# Patient Record
(demographics unavailable — no encounter records)

---

## 2024-11-15 NOTE — ASSESSMENT
[FreeTextEntry1] :  # Hemangioma on L neck, R anterior thigh   - Discussed nature and course of chronic condition.  - Discussed the risks and benefits of different treatment options, including topical timolol and oral propranolol  - C/w topical timolol 0.5% ophthalmic GFS - 1 drop BID to hemangioma--sent 3 mo supply with 1 refill  - Counseled dad to keep the medication in a safe place in order to avoid accidental ingestion of the medication.   #Xerosis, chronic, flare Dry skin care reviewed:  - Take short showers/baths (avoid hot water)  - Use a mild soap (eg. CeraVe cleanser or Aquaphor)  - Use soap only on areas truly needed (underarms,groin,buttocks,fold areas, feet, face, hair)  - Pat off excess water and put moisturizer on immediately (within 3 min.)  Good moisturizing choices include:  1. Cetaphil cream (not baby Cetaphil)  2. CeraVe cream  3. Vanicream cream  4. Aquaphor ointment  5. Vaseline ointment  6. CeraVe ointment  - A moisturizer should always be applied after showering or bathing, but may be applied as many additional times as is necessary.  # Lichen striatus Education, reassurance  RTC PRN

## 2024-11-15 NOTE — PHYSICAL EXAM
[FreeTextEntry3] : light red soft vascular plaque on R anterior thigh--lighter compared to LV light red vascular plaque L neck--lighter/duller compared to LV hypopigmented macules on R abdomen in blaschkoidal distribution

## 2024-11-15 NOTE — CONSULT LETTER
[Dear  ___] : Dear  [unfilled], [Consult Letter:] : I had the pleasure of evaluating your patient, [unfilled]. [Please see my note below.] : Please see my note below. [Consult Closing:] : Thank you very much for allowing me to participate in the care of this patient.  If you have any questions, please do not hesitate to contact me. [Sincerely,] : Sincerely, [FreeTextEntry3] : Nancy Nguyen MD  [facility]

## 2024-11-15 NOTE — HISTORY OF PRESENT ILLNESS
[FreeTextEntry1] : Fu- hemangioma [de-identified] : Shannan is an 10m ex 34 weeker F here for f/u of below, with dad  #hemangioma x 2, R anterior thigh, L neck - both have been present since birth - using timolol BID since  8/19/24, now lighter and duller  # white bumps on abdomen, dad noticed ~2 mos ago, don't seem to be bothering her

## 2024-11-15 NOTE — CONSULT LETTER
[Dear  ___] : Dear  [unfilled], [Consult Letter:] : I had the pleasure of evaluating your patient, [unfilled]. [Please see my note below.] : Please see my note below. [Consult Closing:] : Thank you very much for allowing me to participate in the care of this patient.  If you have any questions, please do not hesitate to contact me. [Sincerely,] : Sincerely, [FreeTextEntry3] : Nancy Nguyen MD  [facility]

## 2024-11-15 NOTE — HISTORY OF PRESENT ILLNESS
[FreeTextEntry1] : Fu- hemangioma [de-identified] : Shannan is an 10m ex 34 weeker F here for f/u of below, with dad  #hemangioma x 2, R anterior thigh, L neck - both have been present since birth - using timolol BID since  8/19/24, now lighter and duller  # white bumps on abdomen, dad noticed ~2 mos ago, don't seem to be bothering her